# Patient Record
Sex: MALE | Race: WHITE | Employment: OTHER | URBAN - METROPOLITAN AREA
[De-identification: names, ages, dates, MRNs, and addresses within clinical notes are randomized per-mention and may not be internally consistent; named-entity substitution may affect disease eponyms.]

---

## 2017-10-14 PROBLEM — I34.0 MITRAL INSUFFICIENCY: Status: ACTIVE | Noted: 2017-10-14

## 2017-10-14 PROBLEM — E78.00 HIGH CHOLESTEROL: Status: ACTIVE | Noted: 2017-10-14

## 2017-10-14 PROBLEM — G93.41 ACUTE METABOLIC ENCEPHALOPATHY: Status: ACTIVE | Noted: 2017-10-14

## 2017-10-14 PROBLEM — I44.2 COMPLETE HEART BLOCK (HCC): Status: ACTIVE | Noted: 2017-10-14

## 2017-10-14 PROBLEM — I35.0 AORTIC STENOSIS: Status: ACTIVE | Noted: 2017-10-14

## 2017-10-14 PROBLEM — G62.9 PERIPHERAL NEUROPATHY: Status: ACTIVE | Noted: 2017-10-14

## 2017-10-19 ENCOUNTER — TELEPHONE (OUTPATIENT)
Dept: CARDIOLOGY CLINIC | Age: 82
End: 2017-10-19

## 2017-10-19 NOTE — TELEPHONE ENCOUNTER
I spoke with Sherry Ritchie and she states that there were no instructions as to when the Tegaderm can come off on     patProgress Notes  Date of Service: 10/18/2017 8:40 AM     Hi Garces NP   Electrophysiology   Expand All Collapse All    []Hide copied text                                                          Cardiology - PROGRESS NOTE     Admit Date: 10/13/2017     Reason for follow up: Heart block      86 y.o. history of AVR, GI bleeding, Mild CAD, HTN, mild dementia who has presented to the hospital with increasing fatigue, lightheadedness for the past three weeks. EMS found him to be bradycardic, and he was found to be in complete AV block. An emergency transvenous pacemaker was inserted over the weekend. EP has been consulted to assess the need for pacemaker implantation        Social History:   reports that he quit smoking about 42 years ago. He has a 90.00 pack-year smoking history. He has never used smokeless tobacco. He reports that he does not drink alcohol or use drugs.    Family History: family history is not on file.                             Interval History:   Patient seen and examined and notes reviewed   Device interrogation today   Tolerating beta-blocker   Improved activity today   All other systems reviewed and negative except as above.           Diet: DIET CARDIAC;  Pain is:Mild  Nausea:None     In: 120 [P.O.:120]  Out: 850        Wt Readings from Last 3 Encounters:   10/18/17 125 lb 7.1 oz (56.9 kg)   09/17/17 143 lb 11.8 oz (65.2 kg)               Data:   Scheduled Meds:   Scheduled Meds:  Scheduled Medications    metoprolol tartrate  25 mg Oral BID    sodium chloride flush  10 mL Intravenous 2 times per day    sodium chloride flush  10 mL Intravenous 2 times per day    thiamine  100 mg Oral Daily    folic acid  1 mg Oral Daily    multivitamin  1 tablet Oral Daily    OLANZapine  5 mg Oral Once    lactobacillus  1 capsule Oral BID WC    aspirin  81 mg Oral Daily    atorvastatin non-tender; bowel sounds normal; no masses,  no organomegaly  Extremities: extremities normal, atraumatic, no cyanosis or edema, pulses: DP +2/+2, PT +2/+2  Neurologic: Mental status: Alert, oriented, thought content appropriate, no tremors, no gross sensory motor deficit,   Psychiatric: normal insight and affect        Assessment & Plan:    Patient Active Problem List:     Complete heart block ([address]     Nonrheumatic aortic valve stenosis     Bilateral carotid artery stenosis     Coronary atherosclerosis     Essential hypertension     High cholesterol     Mitral insufficiency     Peripheral neuropathy (HCC)     S/P AVR     Acute metabolic encephalopathy     Coronary artery disease involving native coronary artery of native heart without angina pectoris     Acute cystitis without hematuria     Symptomatic bradycardia     AV dissociation     Acute encephalopathy         Plan:  1. PPM in situ                        Device interrogated and functioning appropriately  2. Tachycardia                        AS/                        continue beta-blocker therapy   3. ETOH abuse                        On CIWA protocol                        Defer to primary for management  4. Pulmonary congestion                        stable  5. Hx of AVR                        On ASA, statin                                              Continue     Patient is stable from a CV standpoint. Cardiology will sign off with outpatient follow up in 1 weeks. Please call with questions.             Dennis Guzman [facility]  Cardiology   10/18/2017  8:40 AM          ED to Hosp-Admission (Discharged) on 10/13/2017            Detailed Report        Progress Notes Info     Author Note Status Last Update User   Delfin Giles NP Signed Delfin Giles NP   Last Update Date/Time: 10/18/2017  2:33 PM   Chart Review Routing History     Routing history could not be found for this note.  This is because the note has never been routed

## 2017-10-19 NOTE — TELEPHONE ENCOUNTER
Angelique with [facility] called in with a couple questions. When can the Tegaderm that is on the patient for the temporary pacemaker be removed? Patient is having some trouble sleeping, he does have a prescription for Ambien 5 mg, is he able to take that?     Gabby Deras can be reached at [phone]

## 2017-10-19 NOTE — TELEPHONE ENCOUNTER
From a CV standpoint OK to take however not sure who prescribed?   Please have her reference the discharge instructions

## 2017-10-30 ENCOUNTER — OFFICE VISIT (OUTPATIENT)
Dept: CARDIOLOGY CLINIC | Age: 82
End: 2017-10-30

## 2017-10-30 ENCOUNTER — PROCEDURE VISIT (OUTPATIENT)
Dept: CARDIOLOGY CLINIC | Age: 82
End: 2017-10-30

## 2017-10-30 VITALS
HEART RATE: 92 BPM | OXYGEN SATURATION: 96 % | DIASTOLIC BLOOD PRESSURE: 64 MMHG | WEIGHT: 123 LBS | BODY MASS INDEX: 18.64 KG/M2 | SYSTOLIC BLOOD PRESSURE: 110 MMHG | HEIGHT: 68 IN

## 2017-10-30 DIAGNOSIS — Z95.0 PACEMAKER: ICD-10-CM

## 2017-10-30 DIAGNOSIS — I45.89 AV DISSOCIATION: ICD-10-CM

## 2017-10-30 DIAGNOSIS — R00.0 TACHYCARDIA: ICD-10-CM

## 2017-10-30 DIAGNOSIS — I25.10 CORONARY ARTERY DISEASE INVOLVING NATIVE CORONARY ARTERY OF NATIVE HEART WITHOUT ANGINA PECTORIS: Primary | ICD-10-CM

## 2017-10-30 DIAGNOSIS — R00.1 SYMPTOMATIC BRADYCARDIA: ICD-10-CM

## 2017-10-30 DIAGNOSIS — I10 ESSENTIAL HYPERTENSION: ICD-10-CM

## 2017-10-30 DIAGNOSIS — Z95.0 PACEMAKER: Primary | ICD-10-CM

## 2017-10-30 DIAGNOSIS — I44.2 COMPLETE HEART BLOCK (HCC): ICD-10-CM

## 2017-10-30 PROCEDURE — 93280 PM DEVICE PROGR EVAL DUAL: CPT | Performed by: INTERNAL MEDICINE

## 2017-10-30 PROCEDURE — 99024 POSTOP FOLLOW-UP VISIT: CPT | Performed by: NURSE PRACTITIONER

## 2017-10-30 NOTE — PROGRESS NOTES
[address]   Electrophysiology   Date: 10/30/2017  CC:    Chief Complaint   Patient presents with    Follow-Up from Hospital     No cardiac complaints. I had the privilege of visiting Abimael Lu in the office. He presents today for followup after PPM placement for complete heart block. Patient had initially presented to the hospital with complaints of lightheadedness and dizziness. He had temporary pacer and subsequent PPM placed  Patient past medical history significant for history of AVR, GI bleeding, Mild CAD, HTN, mild dementia     Today he reports he is feeling well. Energy level has improved. No acute complaints. HPI: Abimael Lu is a 80 y.o. Past Medical History:   Diagnosis Date    CAD (coronary artery disease)     Hyperlipidemia     Hypertension         Past Surgical History:   Procedure Laterality Date    AORTIC VALVE REPLACEMENT      BACK SURGERY      CARDIAC SURGERY      EYE SURGERY      PACEMAKER PLACEMENT  10/2017       Allergies: Allergies   Allergen Reactions    Morphine Other (See Comments)     Hallucinations    Percocet [Oxycodone-Acetaminophen] Nausea And Vomiting    Tramadol Nausea And Vomiting       Medication:  Current Outpatient Prescriptions   Medication Sig Dispense Refill    metoprolol tartrate (LOPRESSOR) 25 MG tablet Take 12.5 mg by mouth 2 times daily      lactobacillus (CULTURELLE) capsule Take 1 capsule by mouth 2 times daily (with meals) 30 capsule 0    Multiple Vitamin (MULTIVITAMIN) tablet Take 1 tablet by mouth daily 30 tablet 0    metoprolol tartrate (LOPRESSOR) 25 MG tablet Take 1 tablet by mouth 2 times daily 180 tablet 2    pantoprazole (PROTONIX) 40 MG tablet Take 1 tablet by mouth 2 times daily (before meals) 60 tablet 3    atorvastatin (LIPITOR) 40 MG tablet Take 40 mg by mouth daily      aspirin 81 MG tablet Take 81 mg by mouth daily       No current facility-administered medications for this visit.         Social History:  Reviewed. reports that he quit smoking about 42 years ago. He has a 90.00 pack-year smoking history. He has never used smokeless tobacco. He reports that he does not drink alcohol or use drugs. Family History:  Reviewed. family history is not on file. Review of System:    · General ROS: negative for chills, fever, change in weight   · Psychological ROS: negative for  anxiety or depression  · Ophthalmic ROS: negative for  eye pain or loss of vision  · ENT ROS: negative for  headaches, sore throat   · Allergy and Immunology ROS: negative for  hives  · Hematological and Lymphatic ROS: negative for  bleeding problems, blood clots, bruising or jaundice  · Endocrine ROS: negative for  skin changes, temperature intolerance or unexpected weight changes  · Respiratory ROS: negative for  cough, sputum, wheezing, shortness of breath   · Cardiovascular ROS: Per HPI. · Gastrointestinal ROS: negative for abdominal pain, diarrhea, nausea/vomiting, bleeding   · Musculoskeletal ROS: negative for  joint swelling, pain    · Neurological ROS: negative for  confusion, numbness/tingling, seizures, weakness  · Dermatological ROS: negative for rash, changes in skin color, lesions     Physical Examination:  /64   Pulse 92   Ht 5' 8\" (1.727 m)   Wt 123 lb (55.8 kg)   SpO2 96%   BMI 18.70 kg/m²     · Constitutional: Oriented. No distress. · Head: Normocephalic and atraumatic. · Mouth/Throat: Oropharynx is clear and moist.   · Eyes: Conjunctivae normal. EOM are normal.   · Neck: Normal range of motion. Neck supple. No rigidity. No JVD present. · Cardiovascular: Normal rate, regular rhythm, S1&S2 and intact distal pulses. · Left chest wall  · Incision site stable, no hematoma, no ecchymosis   · Pulmonary/Chest: Bilateral respiratory sounds. No wheezes. No rhonchi. · Abdominal: Soft. Bowel sounds present. No distension, No tenderness. · Musculoskeletal: No tenderness.  No edema · Neurological: Alert and oriented. Cranial nerve appears intact, No Gross deficit   · Skin: Skin is warm and dry. No rash noted. · Psychiatric: Has a normal mood, affect and behavior       Labs:  Lab Results   Component Value Date    CREATININE 1.0 10/18/2017    BUN 10 10/18/2017     10/18/2017    K 3.8 10/18/2017     10/18/2017    CO2 24 10/18/2017       Carotid duplex:  4/2017  FINAL CORRECTED IMPRESSIONS  1. Estimated diameter reduction of the right internal carotid artery is   less than 50%. 2.  Estimated diameter reduction of the left internal carotid artery is   less than 50%. 3.  There is difficulty in fully assessing the bilateral internal carotid   arteries, due to significant calcific shadowing, therefore the percentage   stenosis may be underestimated. 4.  The bilateral common and external carotid arteries reveal no   significant stenosis. 5.  The bilateral vertebral arteries are patent with antegrade flow. 6.  The bilateral subclavian arteries reveal no evidence of significant   stenosis. 7.  There has been no significant change from the previous study of   7/29/2014.       Echo:   2013  Study Conclusions    - Left ventricle: The cavity size was normal. Wall thickness    was normal. Systolic function was normal. The estimated    ejection fraction was in the range of 60% to 65%. Wall    motion was normal; there were no regional wall motion    abnormalities. Doppler parameters are consistent with    abnormal left ventricular relaxation (grade 1 diastolic    dysfunction). Aortic valve: Cusp separation was reduced.    Transvalvular velocity was increased, due to stenosis.    There was mild to moderate stenosis. Valve area:    1.75cm^2(VTI). Valve area: 1.3cm^2 (Vmax). Mitral valve:    Mildly calcified annulus. Left atrium: The atrium was    mildly dilated. Right atrium: The atrium was dilated.     Device:   Device interrogated and functioning appropriately      Assessment:   Patient

## 2017-10-30 NOTE — PROGRESS NOTES
Postop check, steri strips removed. Incision clean, dry and intact. No redness, swelling or drainage noted. Postop care/restrictions and carelink reviewed. Patient comes in for programming evaluation for his pacemaker. All sensing and pacing parameters are within normal range. No changes need to be made at this time. Please see interrogation for more detail. He had SVT recorded-see arrhythmia list.  Patient will see NP-Geraldine today.         Patient will follow up with his previous cardio/EP Dr. Joslyn Matias released to [phone] [facility] per pt request.

## 2017-11-27 ENCOUNTER — TELEPHONE (OUTPATIENT)
Dept: CARDIOLOGY CLINIC | Age: 82
End: 2017-11-27